# Patient Record
Sex: FEMALE | Race: BLACK OR AFRICAN AMERICAN | URBAN - METROPOLITAN AREA
[De-identification: names, ages, dates, MRNs, and addresses within clinical notes are randomized per-mention and may not be internally consistent; named-entity substitution may affect disease eponyms.]

---

## 2019-10-29 ENCOUNTER — OFFICE VISIT (OUTPATIENT)
Dept: ONCOLOGY | Age: 44
End: 2019-10-29

## 2019-10-29 VITALS
RESPIRATION RATE: 16 BRPM | HEART RATE: 93 BPM | BODY MASS INDEX: 26.12 KG/M2 | TEMPERATURE: 98 F | DIASTOLIC BLOOD PRESSURE: 82 MMHG | SYSTOLIC BLOOD PRESSURE: 115 MMHG | OXYGEN SATURATION: 98 % | HEIGHT: 64 IN | WEIGHT: 153 LBS

## 2019-10-29 DIAGNOSIS — R10.9 CHRONIC ABDOMINAL PAIN: ICD-10-CM

## 2019-10-29 DIAGNOSIS — E03.9 HYPOTHYROIDISM, UNSPECIFIED TYPE: ICD-10-CM

## 2019-10-29 DIAGNOSIS — G89.29 CHRONIC ABDOMINAL PAIN: ICD-10-CM

## 2019-10-29 DIAGNOSIS — R19.5 DARK STOOLS: ICD-10-CM

## 2019-10-29 DIAGNOSIS — D64.9 ANEMIA, UNSPECIFIED TYPE: Primary | ICD-10-CM

## 2019-10-29 RX ORDER — FOLIC ACID 1 MG/1
TABLET ORAL
COMMUNITY
Start: 2019-08-09

## 2019-10-29 RX ORDER — MEDROXYPROGESTERONE ACETATE 150 MG/ML
INJECTION, SUSPENSION INTRAMUSCULAR
COMMUNITY
Start: 2019-10-02

## 2019-10-29 RX ORDER — CHOLECALCIFEROL (VITAMIN D3) 125 MCG
CAPSULE ORAL
COMMUNITY
Start: 2019-09-26

## 2019-10-29 RX ORDER — HYDROXYZINE 50 MG/1
TABLET, FILM COATED ORAL
Refills: 0 | COMMUNITY
Start: 2019-10-07

## 2019-10-29 RX ORDER — DULOXETIN HYDROCHLORIDE 30 MG/1
CAPSULE, DELAYED RELEASE ORAL
Refills: 0 | COMMUNITY
Start: 2019-10-07

## 2019-10-29 RX ORDER — FAMOTIDINE 20 MG/1
TABLET, FILM COATED ORAL
COMMUNITY
Start: 2019-08-14

## 2019-10-29 RX ORDER — LEVOTHYROXINE SODIUM 125 UG/1
TABLET ORAL
COMMUNITY

## 2019-10-29 RX ORDER — TRAZODONE HYDROCHLORIDE 100 MG/1
TABLET ORAL
Refills: 0 | COMMUNITY
Start: 2019-09-11

## 2019-10-29 RX ORDER — GABAPENTIN 600 MG/1
TABLET ORAL
Refills: 0 | COMMUNITY
Start: 2019-10-09

## 2019-10-29 NOTE — PROGRESS NOTES
[id number] [facility] Oncology at [facility]  [phone]    Hematology / Oncology Consult    Reason for Visit:   Sariah Galindo is a 41 y.o. female who is seen in consultation at the request of Dr. Ailyn Morse for evaluation of anemia. History of Present Illness:   Sariah Galindo is a 41 y.o. female with medical history significant for hypothyroidism who is here for evaluation of anemia. Patient is on the depo shot and denies any menstrual periods. Reports her last period was over a year ago. Prior to depo injections, she states her periods were \"always heavy. \" Reports her stools have been black for the past 2-3 months. Reports she starting taking iron supplements daily; however noticed her stools were black before starting the supplements. She has met with GI and is scheduled for EGD on 19 with GI, unsure of provider. She has never had a colonoscopy. Reports chronic abdominal pain. She takes famotidine for reflux symptoms. Reports eating a balanced diet. Reports increased fatigue. She is currently unemployed. She denies ice cravings. Denies SOB or lightheadedness. Reports intermittent chest pain, none currently. Father: , HIV positive. Mother: Living    Past Medical History:   Diagnosis Date    Anemia     Depression     Headache     Thyroid disease       No past surgical history on file. Social History     Tobacco Use    Smoking status: Former Smoker    Smokeless tobacco: Never Used   Substance Use Topics    Alcohol use: Yes     Alcohol/week: 7.0 standard drinks     Types: 7 Glasses of wine per week     Frequency: 2-3 times a week     Drinks per session: 1 or 2      No family history on file.   Current Outpatient Medications   Medication Sig    gabapentin (NEURONTIN) 600 mg tablet     DULoxetine (CYMBALTA) 30 mg capsule take 1 capsule by mouth once daily    hydrOXYzine HCl (ATARAX) 50 mg tablet take 1 tablet by mouth every 6 hours    levothyroxine (SYNTHROID) 125 mcg tablet Take  by mouth Daily (before breakfast).  cholecalciferol (VITAMIN D3) 5,000 unit capsule     famotidine (PEPCID) 20 mg tablet     folic acid (FOLVITE) 1 mg tablet     traZODone (DESYREL) 100 mg tablet take 1 tablet by mouth at bedtime if needed for sleep    medroxyPROGESTERone (DEPO-PROVERA) 150 mg/mL syrg      No current facility-administered medications for this visit. Allergies   Allergen Reactions    Benadryl [Diphenhydramine Hcl] Other (comments)     States unsteady gait        Review of Systems: A complete review of systems was obtained, negative except as described above. Physical Exam:     Visit Vitals  /82   Pulse 93   Temp 98 °F (36.7 °C) (Oral)   Resp 16   Ht 5' 4\" (1.626 m)   Wt 153 lb (69.4 kg)   SpO2 98%   BMI 26.26 kg/m²     ECOG PS: 0  General: Well developed, no acute distress  Eyes: PERRLA, EOMI, anicteric sclerae  HENT: Atraumatic, OP clear, TMs intact without erythema  Neck: Supple  Lymphatic: No cervical, supraclavicular, axillary or inguinal adenopathy  Respiratory: CTAB, normal respiratory effort  CV: Normal rate, regular rhythm, no murmurs, no peripheral edema  GI: Soft, nontender, nondistended, no masses, no hepatomegaly, no splenomegaly  MS: Normal gait and station. Digits without clubbing or cyanosis. Skin: No rashes, ecchymoses, or petechiae. Normal temperature, turgor, and texture. Neuro/Psych: Alert, oriented. 5/5 strength in all 4 extremities. Appropriate affect, normal judgment/insight.     Results:   No results found for: WBC, HGB, HCT, PLT, MCV, ANEU, HGBPOC, HCTPOC, HGBEXT, HCTEXT, PLTEXT  No results found for: NA, K, CL, CO2, GLU, BUN, CREA, GFRAA, GFRNA, CA, NAPOC, KPOCT, CLPOC, GLUCPOC, IBUN, CREAPOC, ICAI  No results found for: TBILI, ALT, SGOT, AP, TP, ALB, GLOB  No results found for: IRON, FE, TIBC, IBCT, PSAT, FERR    No results found for: B12LT, FOL, RBCF  No results found for: TSH, TSH2, TSH3, TSHP, TSHEXT  No results found for: HAMAT, Yara Espinoza, HAAT, HBSAG, HBSB, HBSAT, HBABN, HBCM, HBCAB, HBCAT, Lum Thelma, [address], HBEAG, XHEPCS, [id number], [address] [facility], [facility], [address] [facility], [facility], [id number], [id number], [address] [facility], [id number], HBCMLT, [id number], HCGAT      Imaging:     Radiology report(s) reviewed. Assessment & Plan:   Rasheed Bernal is a 41 y.o. female with Hypothyroidism comes in for evaluation of anemia. 1. Anemia: Referred by PCP at [facility] clinic, however do not have any lab results or medical records for our review despite multiple requests. No menstrual periods for over 1 yr while on the depo provera injections. Clinical history of dark stools is concerning for upper GI blood loss. She is scheduled for EGD 11/25/19. She started a daily iron supplement last week, which I recommend she continue. -- Iron profile, cbc, ferritin now - call patient with results - if severe iron deficiency present, we will recommend parenteral iron. -- Return in 3 months for labs, md visit. 2. Chronic abdominal pain: Reports dark stools for 2-3 months. She has an EGD scheduled for 11/25/19 with GI, unable to tell me the providers name. Takes famotidine for reflux symptoms. 3. Hypothyroidism: Currently on levothyroxine, managed by PCP at [facility] clinic. Emotional well being: Pt is coping well with his/her disease and has excellent support. I appreciate the opportunity to participate in Ms. Layne Joyce's care.     Signed By: Rui Boyle MD     October 29, 2019

## 2019-10-29 NOTE — PROGRESS NOTES
Layne Mendiola Yuli is a 89 [facility] [employer] y.o. female here today for evaluation of anemia. States no pain today.

## 2019-10-30 ENCOUNTER — TELEPHONE (OUTPATIENT)
Dept: ONCOLOGY | Age: 44
End: 2019-10-30

## 2019-10-30 LAB
BASOPHILS # BLD AUTO: 0 X10E3/UL (ref 0–0.2)
BASOPHILS NFR BLD AUTO: 1 %
EOSINOPHIL # BLD AUTO: 0.3 X10E3/UL (ref 0–0.4)
EOSINOPHIL NFR BLD AUTO: 5 %
ERYTHROCYTE [DISTWIDTH] IN BLOOD BY AUTOMATED COUNT: 13.5 % (ref 12.3–15.4)
FERRITIN SERPL-MCNC: 41 NG/ML (ref 15–150)
HCT VFR BLD AUTO: 38.5 % (ref 34–46.6)
HGB BLD-MCNC: 12.8 G/DL (ref 11.1–15.9)
IMM GRANULOCYTES # BLD AUTO: 0 X10E3/UL (ref 0–0.1)
IMM GRANULOCYTES NFR BLD AUTO: 0 %
IRON SATN MFR SERPL: 14 % (ref 15–55)
IRON SERPL-MCNC: 55 UG/DL (ref 27–159)
LYMPHOCYTES # BLD AUTO: 1.4 X10E3/UL (ref 0.7–3.1)
LYMPHOCYTES NFR BLD AUTO: 28 %
MCH RBC QN AUTO: 31.1 PG (ref 26.6–33)
MCHC RBC AUTO-ENTMCNC: 33.2 G/DL (ref 31.5–35.7)
MCV RBC AUTO: 94 FL (ref 79–97)
MONOCYTES # BLD AUTO: 0.4 X10E3/UL (ref 0.1–0.9)
MONOCYTES NFR BLD AUTO: 8 %
NEUTROPHILS # BLD AUTO: 3 X10E3/UL (ref 1.4–7)
NEUTROPHILS NFR BLD AUTO: 58 %
PLATELET # BLD AUTO: 290 X10E3/UL (ref 150–450)
RBC # BLD AUTO: 4.11 X10E6/UL (ref 3.77–5.28)
TIBC SERPL-MCNC: 397 UG/DL (ref 250–450)
UIBC SERPL-MCNC: 342 UG/DL (ref 131–425)
WBC # BLD AUTO: 5.2 X10E3/UL (ref 3.4–10.8)

## 2019-10-30 NOTE — TELEPHONE ENCOUNTER
10/30/19 10:20 AM Notified patient based on her labs she is only mildly anemic, with normal hgb, ferritin. Her iron saturation was 14. Advised she take an iron supplement 1-2 times per day and to take it with a stool softener to prevent constipation. Educated on signs and symptoms of anemia. Patient verbalized understanding.

## 2020-01-20 DIAGNOSIS — D64.9 ANEMIA, UNSPECIFIED TYPE: ICD-10-CM
